# Patient Record
(demographics unavailable — no encounter records)

---

## 2024-12-06 NOTE — PHYSICAL EXAM
[de-identified] : Patient is well nourished, well-developed, in no acute distress, with appropriate mood and affect. The patient is oriented to time, place, and person. Respirations are even and unlabored. Gait evaluation does reveal a limp. There is no inguinal adenopathy. Bilateral limbs are well-perfused, without skin lesions, shows a grossly normal motor and sensory examination. The right knee motion is significantly reduced and does cause significant pain. The right knee moves from 0 to 115 degrees. The knee is stable within that range-of-motion to AP and ML stress. The alignment of the knee is 5 degrees valgus. Muscle strength is normal. Pedal pulses are palpable. Hip examination was negative. The left knee motion is painless and the left knee moves from 0 to 120 degrees. The knee is stable within that range-of-motion to AP and ML stress. The alignment of the knee is neutral.  Well-healed midline surgical scar.  Muscle strength is normal. Pedal pulses are palpable. Hip examination was negative. [de-identified] : Long standing knee, AP knee, lateral knee, and patellar views of the right] knee were ordered and taken in the office and demonstrate degenerative joint disease of the knee with joint space narrowing, osteophyte formation, and subchondral sclerosis.  AP, lateral, sunrise knee and tunnel x-rays of the left knee were ordered and obtained in the office and demonstrate satisfactory position and alignment of the components are present. No signs of loosening are seen.

## 2024-12-06 NOTE — HISTORY OF PRESENT ILLNESS
[de-identified] : This is a very nice  66 year old  female experiencing  pain in the lateral aspect of the right knee which is moderate in intensity and has been going on for at least 6 months now.  She is doing well after a left total knee arthroplasty previously. Had postop hematoma but overall doing well. Has some pain intermittently but overall doing well.  The pain in the right knee limits activities of daily living. Walking tolerance is  reduced. COrtisone helped x 1 month. The patient denies any radiation of the pain to the feet and it is not associated with numbness, tingling, or weakness.

## 2025-03-28 NOTE — HISTORY OF PRESENT ILLNESS
[de-identified] : This is a very nice 67 year old  female experiencing  pain in the lateral aspect of the right knee which is moderate in intensity and has been going on for at least 6 months now.  She is doing well after a left total knee arthroplasty previously. Had postop hematoma but overall doing well. Has some pain intermittently but overall doing well.  The pain in the right knee limits activities of daily living. Walking tolerance is  reduced. COrtisone helped x 1 month. The patient denies any radiation of the pain to the feet and it is not associated with numbness, tingling, or weakness.

## 2025-03-28 NOTE — PHYSICAL EXAM
[de-identified] : Patient is well nourished, well-developed, in no acute distress, with appropriate mood and affect. The patient is oriented to time, place, and person. Respirations are even and unlabored. Gait evaluation does reveal a limp. There is no inguinal adenopathy. Bilateral limbs are well-perfused, without skin lesions, shows a grossly normal motor and sensory examination. The right knee motion is significantly reduced and does cause significant pain. The right knee moves from 0 to 115 degrees. The knee is stable within that range-of-motion to AP and ML stress. The alignment of the knee is 5 degrees valgus. Muscle strength is normal. Pedal pulses are palpable. Hip examination was negative. The left knee motion is painless and the left knee moves from 0 to 120 degrees. The knee is stable within that range-of-motion to AP and ML stress. The alignment of the knee is neutral.  Well-healed midline surgical scar.  Muscle strength is normal. Pedal pulses are palpable. Hip examination was negative. [de-identified] : Long standing knee, AP knee, lateral knee, and patellar views of the right knee were ordered and taken in the office and demonstrate degenerative joint disease of the knee with joint space narrowing, osteophyte formation, and subchondral sclerosis.  AP, lateral, sunrise knee and tunnel x-rays of the left knee were ordered and obtained in the office and demonstrate satisfactory position and alignment of the components are present. No signs of loosening are seen.

## 2025-03-28 NOTE — DISCUSSION/SUMMARY
[de-identified] : The patient has a well-functioning left total knee arthroplasty and right knee osteoarthritis. An extensive discussion was conducted on the natural history of the disease and the variety of surgical and non-surgical options available to the patient including, but not limited to non-steroidal anti-inflammatory medications, steroid injections, physical therapy, maintenance of ideal body weight, and reduction of activity. Celebrex recommended but she prefers OTC NSAIDs prn pain. PT recommended but she prefers HEP. Today we performed a right knee intraarticular cortisone injection.  The patient will schedule an appointment as needed.  Informed consent for right knee injection  was obtained.  All risks, benefits and alternatives were discussed. These included but were not limited to bleeding, infection and allergic reaction. All questions were answered. A time out was performed. Right  knee were prepped and draped in sterile fashion. Using sterile technique, 40mg of Kenalog, 4cc of 1% lidocaine, 4cc of 0.25% marcaine using a 21-gauge needle. A sterile dressing was applied. Post injection instructions were reviewed. The patient tolerated the procedure well.

## 2025-06-04 NOTE — HISTORY OF PRESENT ILLNESS
[de-identified] : This is a very nice 67 year old  female experiencing  pain in the lateral aspect of the right knee which is moderate in intensity and has been going on for at least 9 months now.  Known right knee osteoarthritis. She is doing well after a left total knee arthroplasty previously.  The pain in the right knee limits activities of daily living. Walking tolerance is  reduced. Cortisone helped x 1 month. The patient denies any radiation of the pain to the feet and it is not associated with numbness, tingling, or weakness.

## 2025-06-04 NOTE — PHYSICAL EXAM
[de-identified] : Patient is well nourished, well-developed, in no acute distress, with appropriate mood and affect. The patient is oriented to time, place, and person. Respirations are even and unlabored. Gait evaluation does reveal a limp. There is no inguinal adenopathy. Bilateral limbs are well-perfused, without skin lesions, shows a grossly normal motor and sensory examination. The right knee motion is significantly reduced and does cause significant pain. The right knee moves from 0 to 115 degrees. The knee is stable within that range-of-motion to AP and ML stress. The alignment of the knee is 5 degrees valgus. Muscle strength is normal. Pedal pulses are palpable. Hip examination was negative. The left knee motion is painless and the left knee moves from 0 to 120 degrees. The knee is stable within that range-of-motion to AP and ML stress. The alignment of the knee is neutral.  Well-healed midline surgical scar.  Muscle strength is normal. Pedal pulses are palpable. Hip examination was negative. [de-identified] : Long standing knee, AP knee, lateral knee, and patellar views of the right knee were ordered and taken in the office and demonstrate degenerative joint disease of the knee with joint space narrowing, osteophyte formation, and subchondral sclerosis.  AP, lateral, sunrise knee and tunnel x-rays of the left knee were ordered and obtained in the office and demonstrate satisfactory position and alignment of the components are present. No signs of loosening are seen.

## 2025-06-04 NOTE — DISCUSSION/SUMMARY
[de-identified] : The patient has a well-functioning left total knee arthroplasty and right knee osteoarthritis. An extensive discussion was conducted on the natural history of the disease and the variety of surgical and non-surgical options available to the patient including, but not limited to non-steroidal anti-inflammatory medications, steroid injections, physical therapy, maintenance of ideal body weight, and reduction of activity. Celebrex recommended but she prefers OTC NSAIDs prn pain. PT recommended but she prefers HEP. Today we performed a right knee intraarticular cortisone injection.  The patient will schedule an appointment as needed.  Informed consent for right knee injection  was obtained.  All risks, benefits and alternatives were discussed. These included but were not limited to bleeding, infection and allergic reaction. All questions were answered. A time out was performed. Right  knee were prepped and draped in sterile fashion. Using sterile technique, 40mg of Kenalog, 4cc of 1% lidocaine, 4cc of 0.25% marcaine using a 21-gauge needle. A sterile dressing was applied. Post injection instructions were reviewed. The patient tolerated the procedure well.